# Patient Record
(demographics unavailable — no encounter records)

---

## 2025-02-28 NOTE — ASSESSMENT
[FreeTextEntry1] : hypogonadism former anabolic steroid use need to optimize hormonal production failed clomid/arimideex in the past trial HCG 2000 IU 3/week repeat labs 1 month later f/u for HCG training

## 2025-02-28 NOTE — LETTER BODY
[Dear  ___] : Dear  [unfilled], [FreeTextEntry2] : Nichole Dewitt MD [facility] [address] [place] [address] [FreeTextEntry1] : I had the pleasure of seeing DREA ATKINS, a 40 year old man,  to your patient Bella Atkins, in the office in consultation today. Please see the attached note for full details.  Thank you very much for allowing me to participate in the care of this patient. If you have any questions please feel free to call me at any time.    Sincerely yours,    Saravanan Hernández MD, [facility] Director, Male Fertility and Microsurgery  of [facility]

## 2025-02-28 NOTE — HISTORY OF PRESENT ILLNESS
[FreeTextEntry1] : 40M   Bella Abdalla (38F) 0.9 ml azoo 46xy Y del neg FSH 26 LH 16 TT 61 former  was using TRT x about 5 years - anabloic steroids last used over 1 year ago was on arimidex and clomid sourced through a freind who is a physicain no previous pregnancies was using armidiex and clomid x 2-3 months prior to the labs we received - clomid 25 qd + arimidex 1mg 2/weekly

## 2025-02-28 NOTE — PHYSICAL EXAM
[Urethral Meatus] : meatus normal [Penis Abnormality] : normal uncircumcised penis [Urinary Bladder Findings] : the bladder was normal on palpation [Scrotum] : the scrotum was normal [Epididymis] : the epididymides were normal [Testes Tenderness] : no tenderness of the testes [Testes Mass (___cm)] : there were no testicular masses [de-identified] : 10cc soft testes. palp bilateral vasa.

## 2025-05-09 NOTE — HISTORY OF PRESENT ILLNESS
[FreeTextEntry1] : 40M here for follow up hypogonadism   - HCG 2000 iu x 3 - HH 12.3/36.6; AST/ALT 27/33; LH 12.0; FSH 19.3, , E2 30.7 - Reports feeling well, no complaints  - denies ED, chest pain nipple sensitivity n/v/d flank pain fever chills

## 2025-05-09 NOTE — ASSESSMENT
[FreeTextEntry1] : 40M here for follow up hypogonadism   Hypogonadism  - TT LH FSH E2 4 weeks after dose adjust will review by phone  - HCG 3000 x 3 weekly  - initiate arimidex 1 MG x 2 weekly   - will f/u 3 months

## 2025-05-09 NOTE — END OF VISIT
[FreeTextEntry3] : I, Dr. Hernández, personally performed the evaluation and management (E/M) services for this established patient who presents today with (a) new problem(s)/exacerbation of (an) existing condition(s). That E/M includes conducting the clinically appropriate interval history &/or exam, assessing all new/exacerbated conditions, and establishing a new plan of care. Today, my FAY, Eliud Titus, was here to observe my evaluation and management service for this new problem/exacerbated condition and follow the plan of care established by me going forward